# Patient Record
Sex: MALE | Race: WHITE | Employment: FULL TIME | URBAN - METROPOLITAN AREA
[De-identification: names, ages, dates, MRNs, and addresses within clinical notes are randomized per-mention and may not be internally consistent; named-entity substitution may affect disease eponyms.]

---

## 2021-12-17 ENCOUNTER — HOSPITAL ENCOUNTER (OUTPATIENT)
Dept: INTERVENTIONAL RADIOLOGY/VASCULAR | Age: 61
Discharge: HOME | End: 2021-12-17
Payer: COMMERCIAL

## 2021-12-17 VITALS
HEART RATE: 70 BPM | RESPIRATION RATE: 15 BRPM | OXYGEN SATURATION: 98 % | SYSTOLIC BLOOD PRESSURE: 161 MMHG | DIASTOLIC BLOOD PRESSURE: 84 MMHG

## 2021-12-17 DIAGNOSIS — M51.36 DDD (DEGENERATIVE DISC DISEASE), LUMBAR: ICD-10-CM

## 2021-12-17 PROCEDURE — 2709999900 IR FLUORO GUIDED NEEDLE PLACEMENT

## 2021-12-17 PROCEDURE — 64483 NJX AA&/STRD TFRM EPI L/S 1: CPT

## 2021-12-17 ASSESSMENT — PAIN DESCRIPTION - PAIN TYPE: TYPE: CHRONIC PAIN

## 2021-12-17 ASSESSMENT — PAIN DESCRIPTION - ORIENTATION: ORIENTATION: LEFT

## 2021-12-17 ASSESSMENT — PAIN DESCRIPTION - LOCATION: LOCATION: BACK

## 2021-12-17 ASSESSMENT — PAIN SCALES - GENERAL: PAINLEVEL_OUTOF10: 2

## 2021-12-17 NOTE — OP NOTE
Operative Note      Patient: Ana Ware  YOB: 1960  MRN: [id number]    Date of Procedure: 2021    [facility]   OUTPATIENT PROCEDURE NOTE      PATIENT NAME: Ana Ware    :  1960    PROCEDURE DATE: 2021 MR: [id number]    PROCEDURE:  LEFT L45 LUMBAR TRANSFORAMINAL EPIDURAL STEROID INJECTION    DIAGNOSIS: LUMBAR DISC HERNIATION AND SPONDYLOSIS WITH RADICULOPATHY    On 2021 the above noted patient was scheduled for outpatient percutaneous injection in the Fluoroscopic Radiology Outpatient Suite. This percutaneous cortisone injection procedure including possible complications has been discussed in detail with this patient prior to the procedure and they have agreed to proceed with this attempted treatment. Therefore, the patient was appropriately positioned on the fluoroscopic table. Prior to preparing the injection site, the patient was viewed under fluoroscopy in both AP and/or lateral views to determine appropriate injection site orientation. Following this, the region of injection was then prepped and draped in appropriate sterile fashion. Following this, the areas of injection were then subsequently identified utilizing judicious fluoroscopic radiographic imaging. Following identification of appropriate landmarks and injection sites, an appropriate length spinal needle was positioned at each injection site, again under fluoroscopic radiograph guidance. Following placement of all injection needle sites, again fluoroscopic imaging views were obtained in order to verify appropriate placement of all injection needles. Placement was also verified by questioning the patient in location to localized pain and sensitivity. Following verification of placement of all injection needles, next appropriate amount for body weight solution mixture involving use of Kenalog 40 mg per ml 4 cc and Marcaine . 25% 1cc was injected at LEFT L45 LUMBAR TRANSFORAMINAL EPIDURAL STEROID INJECTION site under sterile technique. Standard procedure to assure appropriate injection was performed at each site. Following this all injection needles were removed, and after being cleansed sterile bandages were applied. The patient tolerated this procedure well. The patient was observed in the outpatient setting for a minimum of 15-20 minutes to assure they were feeling satisfactory prior to their discharge to home. All patients have been told prior to scheduling of this outpatient procedure to arrange for a family member or friend to provide home transportation. Patient was scheduled for FU visit in the next 1-2 weeks post injection for further clinical evaluation and treatment. There were no complications.     NESTOR Ivory.     12/17/2021  10:01 AM             Electronically signed by Dami Rodríguez MD on 12/17/2021 at 10:01 AM